# Patient Record
Sex: FEMALE | Race: WHITE
[De-identification: names, ages, dates, MRNs, and addresses within clinical notes are randomized per-mention and may not be internally consistent; named-entity substitution may affect disease eponyms.]

---

## 2019-03-22 ENCOUNTER — HOSPITAL ENCOUNTER (OUTPATIENT)
Dept: HOSPITAL 92 - SDC | Age: 59
Discharge: HOME | End: 2019-03-22
Attending: NEUROLOGICAL SURGERY
Payer: COMMERCIAL

## 2019-03-22 DIAGNOSIS — M50.122: Primary | ICD-10-CM

## 2019-03-22 DIAGNOSIS — Z79.899: ICD-10-CM

## 2019-03-22 DIAGNOSIS — M48.02: ICD-10-CM

## 2019-03-22 DIAGNOSIS — M19.90: ICD-10-CM

## 2019-03-22 DIAGNOSIS — Z87.891: ICD-10-CM

## 2019-03-22 LAB
APTT PPP: 30.1 SEC (ref 22.9–36.1)
BASOPHILS # BLD AUTO: 0 THOU/UL (ref 0–0.2)
BASOPHILS NFR BLD AUTO: 0.6 % (ref 0–1)
EOSINOPHIL # BLD AUTO: 0 THOU/UL (ref 0–0.7)
EOSINOPHIL NFR BLD AUTO: 0.8 % (ref 0–10)
HGB BLD-MCNC: 13 G/DL (ref 12–16)
INR PPP: 1
LYMPHOCYTES # BLD: 1.4 THOU/UL (ref 1.2–3.4)
LYMPHOCYTES NFR BLD AUTO: 27.6 % (ref 21–51)
MCH RBC QN AUTO: 30.4 PG (ref 27–31)
MCV RBC AUTO: 94.6 FL (ref 78–98)
MONOCYTES # BLD AUTO: 0.5 THOU/UL (ref 0.11–0.59)
MONOCYTES NFR BLD AUTO: 8.8 % (ref 0–10)
NEUTROPHILS # BLD AUTO: 3.2 THOU/UL (ref 1.4–6.5)
NEUTROPHILS NFR BLD AUTO: 62.2 % (ref 42–75)
PLATELET # BLD AUTO: 201 THOU/UL (ref 130–400)
PROTHROMBIN TIME: 13.2 SEC (ref 12–14.7)
RBC # BLD AUTO: 4.28 MILL/UL (ref 4.2–5.4)
WBC # BLD AUTO: 5.1 THOU/UL (ref 4.8–10.8)

## 2019-03-22 PROCEDURE — 85730 THROMBOPLASTIN TIME PARTIAL: CPT

## 2019-03-22 PROCEDURE — 76000 FLUOROSCOPY <1 HR PHYS/QHP: CPT

## 2019-03-22 PROCEDURE — 85025 COMPLETE CBC W/AUTO DIFF WBC: CPT

## 2019-03-22 PROCEDURE — C1713 ANCHOR/SCREW BN/BN,TIS/BN: HCPCS

## 2019-03-22 PROCEDURE — 85610 PROTHROMBIN TIME: CPT

## 2019-03-22 PROCEDURE — C1776 JOINT DEVICE (IMPLANTABLE): HCPCS

## 2019-03-22 PROCEDURE — 36415 COLL VENOUS BLD VENIPUNCTURE: CPT

## 2019-03-22 NOTE — HP
HISTORY OF PRESENT ILLNESS:  This is a 58-year-old female who reports to our office

for evaluation of neck and right arm pain.  The patient states that she originally

thought this pain from her shoulder and she saw an orthopedist, however, states that

it is not shoulder.  The patient states that she has been getting headaches and

right side of the neck pain along with constant shoulder and upper arm pain.  She

also has numbness in her thumb and middle and 1st fingers.  The patient has

decreased balance and she now denies any pain in the left arm.  The patient has seen

Pain Management for injections as well as physical therapy, they helped some, but

her most recent injections did not help at all. 



REVIEW OF SYSTEMS:  A 10-point review of systems has been completed and is negative

other than stated in the above HPI. 



PAST MEDICAL HISTORY:  Arthritis and cervical neck pain.



PAST SURGICAL HISTORY:  Kidney stone removal in 2017.



FAMILY HISTORY:  Father is , diagnosed with diabetes.  Mother is alive,

diagnosed with heart disease and cancer. 



SOCIAL HISTORY:  The patient is a former smoker.  Denies alcohol or other illicit

drug use.  The patient is sexually active. 



MEDICATIONS:  

1. Gabapentin.

2. Digestive enzymes.

3. Activella.

4. Simethicone Extra Strength.

5. Probiotic.

6. Urocit-K 15.



ALLERGIES:  NO KNOWN DRUG ALLERGIES.



PHYSICAL EXAMINATION:

HEENT.  Head is normocephalic.  Extraocular movements are intact.  Pupils are

intact.  Hearing is intact.  Moist mucous membranes. 

NECK:  Normal, soft, and supple.  No masses are noted.  Range of motion is intact,

painful range of motion. 

NEUROLOGIC:  Awake, alert, and oriented x3.  Memory, attention, and fund of

knowledge, and language are normal. 

CRANIAL NERVES:  Cranial nerves are grossly intact. 

EXTREMITIES:  Upper extremities, 5/5 bilateral strength, deltoids, biceps, wrist

extension, finger extension, 4/5 right triceps, finger extension.  Reflexes are

symmetrical.  Decreased sensation in the thumb and middle fingers on the right hand

compared to the left. 

RESPIRATORY:  Normal work of breathing on room air.



IMAGING DATA:  Cervical MRI, C5-C6 herniated nucleus pulposus with central stenosis

and C6-C7 herniated nucleus pulposus with right greater than left foraminal

stenosis. 



ASSESSMENT AND PLAN:  Degenerative disk disease, herniated nucleus pulposus,

cervical region with cervical radiculopathy and spinal stenosis.  Dr. Toussaint has

offered ACDF.  We have obtained informed consent.  We have discussed the

indications, risks, benefits, and alternatives, and expected results from surgery.

The risks discussed included, but were not limited to, bleeding, infection, CSF

leak, nerve damage, weakness, swallowing trouble, feeding tube placement, tracheal

injury, esophageal injury, vocal cord injury, spinal cord injury, incontinence,

paralysis, ventilator dependence, wheelchair dependence, stroke, loss of vision,

cardiopulmonary complications of anesthesia or death.  Long-term complications

discussed included, but were not limited to, hardware failure, degradation of

surrounding disks.  The patient states she understands the risks and is willing to

proceed. 







Job ID:  [id number]

## 2019-03-22 NOTE — OP
DATE OF PROCEDURE:  03/22/2019



ASSISTANT:  Kari Suresh PA-C



PREOPERATIVE INDICATION:  Treat pain and prevent neurological deterioration.



PREOPERATIVE DIAGNOSES:  Right-sided C6 and C7 radiculopathies from intervertebral

disk herniations at C5-C6 and C6-C7. 



POSTOPERATIVE DIAGNOSES:  Right-sided C6 and C7 radiculopathies from intervertebral

disk herniations at C5-C6 and C6-C7. 



PROCEDURES PERFORMED:  Anterior cervical diskectomy, intervertebral arthrodesis,

placement of intervertebral biomechanical device, anterior cervical plating C5-C6

and C6-C7, local morselized autograft, morselized allograft, and operating

microscope. 



PREOPERATIVE MEDICATION:  Ancef 2 g IV.



DRAINS NUMBER:  Zero.



DRAIN TYPE:  None.



DESCRIPTION OF PROCEDURE:  The patient was brought to the operating room.  General

endotracheal anesthesia was induced.  The patient was positioned supine with her

head supported by a gel-filled doughnut-shaped headrest and a lateral fluoro

radiograph was used to plan our incision.  The right side of the neck was sterilely

prepped and draped.  We opened with a 10 blade knife and controlled bleeding with

bipolar cautery.  We dissected sharply to the platysma.  We cut this muscle in line

with our incision and we continued our dissection medial to the sternocleidomastoid

and lateral to the trachea and esophagus.  We arrived at the prevertebral space.  We

placed a marker at the interspace of C5-C6 and took a lateral fluoro radiograph to

confirm the levels upon which we were operating.  We then elevated the longus colli

muscles off the anterior surface of C5, C6, and C7, and placed a self-retaining

retractor beneath them.  We placed distraction pins at C5 and C7 and distracted

across both of the intervening interspaces.  We incised the interspaces with a 15

blade knife and removed disk contents using curettes and rongeurs.  As we approached

the posterior longitudinal ligament, we brought the operating microscope into the

field. 



Under microscopic magnification and using microsurgical techniques, we removed the

remainder of the intervertebral disk.  We accessed the ventral epidural space with

an angled microcurette and using Kerrison rongeurs, we removed posterior osteophytes

and posterior longitudinal ligament across the entire interspace at C5-C6 and again

at C6-C7.  Until we had decompressed both neural foramina and the center of the

canal.  We turned our attention to arthrodesis.  We removed cartilaginous cap from

the endplates at the C5-C6 and C6-C7 with curettes.  We measured the height of the

interspace with a bone rasp to 7 mm at C6-C7 and 6 mm at C5-C6.  The

appropriately-sized PEEK intervertebral grafts were brought into the field.

Osteophytes removed during our decompression were cleaned of soft tissue

attachments, carefully morselized and added to demineralized bone matrix to form a

fusion substrate.  The substrate was packed into the PEEK grafts and those were

advanced into the interspaces under radiographic guidance to the appropriate depth.

We then removed our distraction pins and took the operating microscope out of the

field.  We brought a 28-mm anterior cervical plate into the field.  We drilled 

holes through the plate into the vertebral bodies and then affixed the plate using

14 mm screws.  We used fixed angle screws at C7 and variable angle screws at C6 and

C5.  We engaged the locking mechanism over each of the 6 screws.  We irrigated

copiously with bacitracin irrigation.  AP and lateral fluoro radiographs confirmed

adequate positioning of our instrumentation.  We closed the wound in anatomical

layers.  We applied a sterile dressing.  This was a clean case, no contamination. 







Job ID:  [id number]